# Patient Record
Sex: MALE | Race: WHITE | URBAN - METROPOLITAN AREA
[De-identification: names, ages, dates, MRNs, and addresses within clinical notes are randomized per-mention and may not be internally consistent; named-entity substitution may affect disease eponyms.]

---

## 2023-04-17 ENCOUNTER — WEB ENCOUNTER (OUTPATIENT)
Dept: URBAN - METROPOLITAN AREA CLINIC 19 | Facility: CLINIC | Age: 63
End: 2023-04-17

## 2023-04-17 ENCOUNTER — OFFICE VISIT (OUTPATIENT)
Dept: URBAN - METROPOLITAN AREA CLINIC 19 | Facility: CLINIC | Age: 63
End: 2023-04-17
Payer: COMMERCIAL

## 2023-04-17 ENCOUNTER — TELEPHONE ENCOUNTER (OUTPATIENT)
Dept: URBAN - METROPOLITAN AREA CLINIC 96 | Facility: CLINIC | Age: 63
End: 2023-04-17

## 2023-04-17 VITALS
HEIGHT: 70 IN | WEIGHT: 175 LBS | SYSTOLIC BLOOD PRESSURE: 126 MMHG | DIASTOLIC BLOOD PRESSURE: 80 MMHG | BODY MASS INDEX: 25.05 KG/M2 | TEMPERATURE: 98.1 F

## 2023-04-17 DIAGNOSIS — B19.20 HEPATITIS C VIRUS INFECTION WITHOUT HEPATIC COMA, UNSPECIFIED CHRONICITY: ICD-10-CM

## 2023-04-17 DIAGNOSIS — K76.82 HEPATIC ENCEPHALOPATHY: ICD-10-CM

## 2023-04-17 DIAGNOSIS — R18.8 OTHER ASCITES: ICD-10-CM

## 2023-04-17 DIAGNOSIS — I85.10 SECONDARY ESOPHAGEAL VARICES WITHOUT BLEEDING: ICD-10-CM

## 2023-04-17 DIAGNOSIS — K74.69 OTHER CIRRHOSIS OF LIVER: ICD-10-CM

## 2023-04-17 PROBLEM — 389026000: Status: ACTIVE | Noted: 2023-04-17

## 2023-04-17 PROBLEM — 19943007: Status: ACTIVE | Noted: 2023-04-17

## 2023-04-17 PROBLEM — 14223005: Status: ACTIVE | Noted: 2023-04-17

## 2023-04-17 PROBLEM — 50711007: Status: ACTIVE | Noted: 2023-04-17

## 2023-04-17 PROCEDURE — 99204 OFFICE O/P NEW MOD 45 MIN: CPT | Performed by: INTERNAL MEDICINE

## 2023-04-17 RX ORDER — SPIRONOLACTONE 100 MG/1
1 TABLET TABLET, FILM COATED ORAL ONCE A DAY
Qty: 90 | Refills: 1 | OUTPATIENT
Start: 2023-04-17 | End: 2023-10-14

## 2023-04-17 RX ORDER — FUROSEMIDE 40 MG/1
1 TABLET TABLET ORAL ONCE A DAY
Qty: 90 TABLET | Refills: 1 | OUTPATIENT
Start: 2023-04-17

## 2023-04-17 RX ORDER — NADOLOL 20 MG/1
1 TABLET TABLET ORAL ONCE A DAY
Qty: 90 TABLET | Refills: 1 | OUTPATIENT
Start: 2023-04-17

## 2023-04-17 RX ORDER — RIFAXIMIN 550 MG/1
1 TABLET TABLET ORAL TWICE A DAY
Qty: 180 TABLET | Refills: 1 | OUTPATIENT
Start: 2023-04-17 | End: 2023-10-14

## 2023-04-17 RX ORDER — PANTOPRAZOLE SODIUM 40 MG/1
1 TABLET TABLET, DELAYED RELEASE ORAL ONCE A DAY
Status: ACTIVE | COMMUNITY

## 2023-04-17 RX ORDER — FUROSEMIDE 40 MG/1
1 TABLET TABLET ORAL ONCE A DAY
Status: ACTIVE | COMMUNITY

## 2023-04-17 RX ORDER — SPIRONOLACTONE 50 MG/1
1 TABLET TABLET, FILM COATED ORAL ONCE A DAY
Status: ACTIVE | COMMUNITY

## 2023-04-17 RX ORDER — NADOLOL 20 MG/1
1 TABLETS TABLET ORAL ONCE A DAY
Status: ACTIVE | COMMUNITY

## 2023-04-17 NOTE — HPI-TODAY'S VISIT:
Patient presents to my office as a self-referral since his previous gastroenterologists (Dr. Rolando Rivero, Dr. Elver Villegas) apparently do not take his insurance. I had no records today - will obtain for review. History taken purely from patient and his wife.  The patient was reportedly diagnosed with hepatitis C cirrhosis about 2 years ago. He admitted to prior intravenous drug use, although he also stepped on a hypodermic needle when he was younger. He states that when he was diagnosed, he had his gallbladder removed at [facility] in [place], [place], and he had a lot of issues with fluid (ascites). He was seen by Dr. Rolando Rivero initially in [place], but he eventually saw Dr. Elvre Villegas in [facility] - he has been admitted to [facility] as well for cirrhosis-related issues. His wife states that he was admitted earlier this year (January) to [facility] for confusion. He is currently on diuretics (currently not edematous and with no ascites) and nadolol (for possible varices). He has 2-3 loose bowel movements daily without lactulose, but he does have issues with daytime somnolence/nighttime insomnia and confusion, suggestive of hepatic encephalopathy. He has received his first vaccination for hepatitis A/B. He has not been treated for hepatitis C. He states that he has had recent endoscopy (for variceal screening) and imaging (for HCC screening).

## 2023-04-18 ENCOUNTER — TELEPHONE ENCOUNTER (OUTPATIENT)
Dept: URBAN - METROPOLITAN AREA CLINIC 19 | Facility: CLINIC | Age: 63
End: 2023-04-18

## 2023-04-19 LAB
A/G RATIO: 0.9
AFP, SERUM, TUMOR MARKER: 8.8
ALBUMIN: 3
ALKALINE PHOSPHATASE: 137
ALT (SGPT): 17
AST (SGOT): 44
BILIRUBIN, TOTAL: 3
BUN/CREATININE RATIO: (no result)
BUN: 14
CALCIUM: 8.8
CARBON DIOXIDE, TOTAL: 27
CHLORIDE: 106
CREATININE: 1.28
EGFR: 63
GLOBULIN, TOTAL: 3.2
GLUCOSE: 131
HEMATOCRIT: 28.9
HEMOGLOBIN: 10.3
INR: 1.1
MCH: 34.3
MCHC: 35.6
MCV: 96.3
MPV: 11.4
PLATELET COUNT: (no result)
POTASSIUM: 3.5
PROTEIN, TOTAL: 6.2
PT: 11.7
RDW: 13.4
RED BLOOD CELL COUNT: 3
SODIUM: 139
WHITE BLOOD CELL COUNT: 9

## 2023-05-02 ENCOUNTER — LAB OUTSIDE AN ENCOUNTER (OUTPATIENT)
Dept: URBAN - METROPOLITAN AREA CLINIC 19 | Facility: CLINIC | Age: 63
End: 2023-05-02

## 2023-05-02 ENCOUNTER — TELEPHONE ENCOUNTER (OUTPATIENT)
Dept: URBAN - METROPOLITAN AREA CLINIC 19 | Facility: CLINIC | Age: 63
End: 2023-05-02

## 2023-05-02 PROBLEM — 300331000: Status: ACTIVE | Noted: 2023-05-02

## 2023-05-03 ENCOUNTER — TELEPHONE ENCOUNTER (OUTPATIENT)
Dept: URBAN - METROPOLITAN AREA CLINIC 19 | Facility: CLINIC | Age: 63
End: 2023-05-03

## 2023-05-05 ENCOUNTER — TELEPHONE ENCOUNTER (OUTPATIENT)
Dept: URBAN - METROPOLITAN AREA CLINIC 19 | Facility: CLINIC | Age: 63
End: 2023-05-05

## 2023-05-05 RX ORDER — SPIRONOLACTONE 50 MG/1
1 TABLET TABLET, FILM COATED ORAL ONCE A DAY
Status: ACTIVE | COMMUNITY

## 2023-05-05 RX ORDER — FUROSEMIDE 40 MG/1
1 TABLET TABLET ORAL ONCE A DAY
Status: ACTIVE | COMMUNITY

## 2023-05-05 RX ORDER — PANTOPRAZOLE SODIUM 40 MG/1
1 TABLET TABLET, DELAYED RELEASE ORAL ONCE A DAY
Qty: 90 TABLET | Refills: 1 | OUTPATIENT
Start: 2023-05-05

## 2023-05-05 RX ORDER — SPIRONOLACTONE 100 MG/1
1 TABLET TABLET, FILM COATED ORAL ONCE A DAY
Qty: 90 | Refills: 1 | Status: ACTIVE | COMMUNITY
Start: 2023-04-17 | End: 2023-10-14

## 2023-05-05 RX ORDER — NADOLOL 20 MG/1
1 TABLET TABLET ORAL ONCE A DAY
Qty: 90 TABLET | Refills: 1 | Status: ACTIVE | COMMUNITY
Start: 2023-04-17

## 2023-05-05 RX ORDER — FUROSEMIDE 40 MG/1
1 TABLET TABLET ORAL ONCE A DAY
Qty: 90 TABLET | Refills: 1 | Status: ACTIVE | COMMUNITY
Start: 2023-04-17

## 2023-05-05 RX ORDER — NADOLOL 20 MG/1
1 TABLETS TABLET ORAL ONCE A DAY
Status: ACTIVE | COMMUNITY

## 2023-05-05 RX ORDER — RIFAXIMIN 550 MG/1
1 TABLET TABLET ORAL TWICE A DAY
Qty: 180 TABLET | Refills: 1 | Status: ACTIVE | COMMUNITY
Start: 2023-04-17 | End: 2023-10-14

## 2023-05-05 RX ORDER — PANTOPRAZOLE SODIUM 40 MG/1
1 TABLET TABLET, DELAYED RELEASE ORAL ONCE A DAY
Status: ACTIVE | COMMUNITY

## 2023-05-09 ENCOUNTER — TELEPHONE ENCOUNTER (OUTPATIENT)
Dept: URBAN - METROPOLITAN AREA CLINIC 19 | Facility: CLINIC | Age: 63
End: 2023-05-09

## 2023-05-12 LAB
A/G RATIO: 1
ACTIN (SMOOTH MUSCLE) ANTIBODY (IGG): <20
ALBUMIN: 2.6
ALKALINE PHOSPHATASE: 106
ALPHA-1-ANTITRYPSIN (AAT) PHENOTYPE: (no result)
ALT (SGPT): 13
ANA SCREEN, IFA: NEGATIVE
AST (SGOT): 37
BILIRUBIN, TOTAL: 1.8
BUN/CREATININE RATIO: 15
BUN: 23
CALCIUM: 8.5
CARBON DIOXIDE, TOTAL: 22
CERULOPLASMIN: 26
CHLORIDE: 106
COMMENT: (no result)
COPPER, SERUM: 104
CREATININE: 1.55
EGFR: 50
FERRITIN, SERUM: 31
GLOBULIN, TOTAL: 2.7
GLUCOSE: 145
HCV RNA, QUANTITATIVE REAL TIME PCR: (no result)
HCV RNA, QUANTITATIVE REAL TIME PCR: 6.19
HEMATOCRIT: 23.1
HEMOGLOBIN: 8.1
HEP A AB, IGM: (no result)
HEP B CORE AB, IGM: (no result)
HEPATITIS A AB, TOTAL: REACTIVE
HEPATITIS B SURFACE AB IMMUNITY, QN: <5
HEPATITIS B SURFACE ANTIGEN: (no result)
HEPATITIS C ANTIBODY: REACTIVE
IGG, SUBCLASS 4: 48.6
IMMUNOGLOBULIN G, QN, SERUM: 1358
INDEX: >11
IRON BIND.CAP.(TIBC): 295
IRON SATURATION: 17
IRON: 51
LKM-1 ANTIBODY (IGG): <=20
MCH: 33.5
MCHC: 35.1
MCV: 95.5
MITOCHONDRIAL (M2) ANTIBODY: <=20
MPV: 9.8
PLATELET COUNT: 88
POTASSIUM: 3.9
PROTEIN, TOTAL: 5.3
RDW: 12.7
RED BLOOD CELL COUNT: 2.42
SODIUM: 137
WHITE BLOOD CELL COUNT: 11.3

## 2023-06-08 ENCOUNTER — P2P PATIENT RECORD (OUTPATIENT)
Age: 63
End: 2023-06-08

## 2023-06-19 ENCOUNTER — OFFICE VISIT (OUTPATIENT)
Dept: URBAN - METROPOLITAN AREA CLINIC 19 | Facility: CLINIC | Age: 63
End: 2023-06-19

## 2023-07-05 ENCOUNTER — TELEPHONE ENCOUNTER (OUTPATIENT)
Dept: URBAN - METROPOLITAN AREA CLINIC 19 | Facility: CLINIC | Age: 63
End: 2023-07-05

## 2023-07-07 ENCOUNTER — TELEPHONE ENCOUNTER (OUTPATIENT)
Dept: URBAN - METROPOLITAN AREA CLINIC 19 | Facility: CLINIC | Age: 63
End: 2023-07-07

## 2023-07-25 ENCOUNTER — OFFICE VISIT (OUTPATIENT)
Dept: URBAN - METROPOLITAN AREA CLINIC 19 | Facility: CLINIC | Age: 63
End: 2023-07-25
Payer: COMMERCIAL

## 2023-07-25 VITALS
DIASTOLIC BLOOD PRESSURE: 60 MMHG | TEMPERATURE: 98.7 F | HEART RATE: 68 BPM | BODY MASS INDEX: 27.77 KG/M2 | WEIGHT: 194 LBS | HEIGHT: 70 IN | SYSTOLIC BLOOD PRESSURE: 120 MMHG

## 2023-07-25 DIAGNOSIS — R18.8 OTHER ASCITES: ICD-10-CM

## 2023-07-25 DIAGNOSIS — K76.89 HEPATIC CYST: ICD-10-CM

## 2023-07-25 DIAGNOSIS — I85.10 SECONDARY ESOPHAGEAL VARICES WITHOUT BLEEDING: ICD-10-CM

## 2023-07-25 DIAGNOSIS — K74.69 OTHER CIRRHOSIS OF LIVER: ICD-10-CM

## 2023-07-25 DIAGNOSIS — B19.20 HEPATITIS C VIRUS INFECTION WITHOUT HEPATIC COMA, UNSPECIFIED CHRONICITY: ICD-10-CM

## 2023-07-25 DIAGNOSIS — K76.82 HEPATIC ENCEPHALOPATHY: ICD-10-CM

## 2023-07-25 DIAGNOSIS — K43.9 VENTRAL HERNIA WITHOUT OBSTRUCTION OR GANGRENE: ICD-10-CM

## 2023-07-25 PROBLEM — 414396006: Status: ACTIVE | Noted: 2023-07-25

## 2023-07-25 PROBLEM — 85057007: Status: ACTIVE | Noted: 2023-07-25

## 2023-07-25 PROCEDURE — 99214 OFFICE O/P EST MOD 30 MIN: CPT | Performed by: INTERNAL MEDICINE

## 2023-07-25 RX ORDER — PANTOPRAZOLE SODIUM 40 MG/1
1 TABLET TABLET, DELAYED RELEASE ORAL ONCE A DAY
Qty: 90 TABLET | Refills: 1 | Status: ACTIVE | COMMUNITY
Start: 2023-05-05

## 2023-07-25 RX ORDER — FUROSEMIDE 40 MG/1
1 TABLET TABLET ORAL ONCE A DAY
Status: ACTIVE | COMMUNITY

## 2023-07-25 RX ORDER — PANTOPRAZOLE SODIUM 40 MG/1
1 TABLET TABLET, DELAYED RELEASE ORAL ONCE A DAY
Status: ACTIVE | COMMUNITY

## 2023-07-25 RX ORDER — SPIRONOLACTONE 50 MG/1
1 TABLET TABLET, FILM COATED ORAL ONCE A DAY
Status: ACTIVE | COMMUNITY

## 2023-07-25 RX ORDER — NADOLOL 20 MG/1
1 TABLET TABLET ORAL ONCE A DAY
Qty: 90 TABLET | Refills: 1 | Status: ACTIVE | COMMUNITY
Start: 2023-04-17

## 2023-07-25 RX ORDER — NADOLOL 20 MG/1
1 TABLETS TABLET ORAL ONCE A DAY
Status: ACTIVE | COMMUNITY

## 2023-07-25 RX ORDER — FUROSEMIDE 40 MG/1
1 TABLET TABLET ORAL ONCE A DAY
Qty: 90 TABLET | Refills: 1 | Status: ACTIVE | COMMUNITY
Start: 2023-04-17

## 2023-07-25 RX ORDER — RIFAXIMIN 550 MG/1
1 TABLET TABLET ORAL TWICE A DAY
Qty: 180 TABLET | Refills: 1 | Status: ACTIVE | COMMUNITY
Start: 2023-04-17 | End: 2023-10-14

## 2023-07-25 RX ORDER — SPIRONOLACTONE 100 MG/1
1 TABLET TABLET, FILM COATED ORAL ONCE A DAY
Qty: 90 | Refills: 1 | Status: ACTIVE | COMMUNITY
Start: 2023-04-17 | End: 2023-10-14

## 2023-07-25 NOTE — PHYSICAL EXAM GASTROINTESTINAL
Abdomen , soft, nontender, distended with ventral hernia , no guarding or rigidity , normal bowel sounds , Liver and Spleen , no hepatomegaly present , no hepatosplenomegaly , liver nontender , spleen not palpable Rectal deferred

## 2023-07-25 NOTE — HPI-TODAY'S VISIT:
4/17/23: Patient presents to my office as a self-referral since his previous gastroenterologists (Dr. Rolando Rivero, Dr. Elver Villegas) apparently do not take his insurance. I had no records today - will obtain for review. History taken purely from patient and his wife.  The patient was reportedly diagnosed with hepatitis C cirrhosis about 2 years ago. He admitted to prior intravenous drug use, although he also stepped on a hypodermic needle when he was younger. He states that when he was diagnosed, he had his gallbladder removed at [facility] in [place], [place], and he had a lot of issues with fluid (ascites). He was seen by Dr. Rolando Rivero initially in [place], but he eventually saw Dr. Elver Villegas in [facility] - he has been admitted to [facility] as well for cirrhosis-related issues. His wife states that he was admitted earlier this year (January) to [facility] for confusion. He is currently on diuretics (currently not edematous and with no ascites) and nadolol (for possible varices). He has 2-3 loose bowel movements daily without lactulose, but he does have issues with daytime somnolence/nighttime insomnia and confusion, suggestive of hepatic encephalopathy. He has received his first vaccination for hepatitis A/B. He has not been treated for hepatitis C. He states that he has had recent endoscopy (for variceal screening) and imaging (for HCC screening).  ADDENDUM: I got records from [facility] ([place], [place]) after the patient left my clinic. It showed that he was admitted from 1/5/23 to 1/9/23 - he was seen by Dr. Rolando Rivero at that time. He apparently was on his way from [place] to [place] and had to be picked up by EMS at a motel in [place], [place] and brought to [facility] due to altered mental status (slurred speech, lethargy). He had a battery of imaging studies of his head done to rule out a stroke, it was determined that his altered mental status was due to hepatic encephalopathy - this was treated with lactulose, and his mentation improved. He also had a macrocytic anemia with hematochezia - an EGD done on 1/8/23 showed no signs of portal hypertension but prepyloric ulcers were seen (positive for H. pylori on biopsy). A colonoscopy on 1/9/23 showed no significant pathology, although the endoscopist did mention that there was "friable" mucosa throughout the colon and hemorrhoids. CT scan showed signs of cirrhosis/portal hypertension. There was a ventral hernia that was filled with a small amount of ascites. Cystic lesions were seen in the liver on CT that were verified on MRI. His MELD-Na score was in the low 20s during his admission. He did confirm that he was abusing alcohol at that time. He was discharged on lactulose 30cc po BID, ferrous sulfate 325 mg po QOD, nadolol 20 mg po QD, pantoprazole 40 mg po QD, thiamine 100 mg po daily, furosemide 40 mg po QD, spironolactone 100 mg po daily, and lisinopril 40 mg po QD. It is not clear that he was ever treated for H. pylori.  7/25/23: Patient presents for reevaluation of his cirrhosis. He has not had an alcoholic beverage since January 2023, and he has been compliant with his medications. Minimal edema of the lower extremities and minimal ascites noted. He is wearing an abdominal binder due to the ventral hernia, which is somewhat tender. He has no confusion since starting the Xifaxan - he was able to get financial assistance through [employer]. Also taking lactulose. He has been getting his hepatitis B vaccination (immune to hepatitis A). Labs drawn at his last office visit showed no other cause for his cirrhosis except hepatitis C (viral load [id number] IU/mL). His AFP was elevated at 8.8, but a repeat MRI (done at [facility]) showed that there were no solid masses, only cysts. MELD-Na was calculated at 15. He has never been treated for hepatitis C - it is unclear when he may have contracted the virus.  We had a long discussion about his need to maintain abstinence and be compliant with low-sodium diet, vaccinations, and medications. He is still having some discomfort related to the hernia - I agreed to refer him to [facility] to discuss possible liver transplant and/or hernia surgery. He is high-risk for surgery due to his cirrhosis; patient would benefit from having his ventral hernia repair done at a transplant center by a general surgeon or hepatobiliary surgeon. I will also leave treatment of hepatitis C up to the transplant center since this may impact his liver transplant status.

## 2023-07-26 ENCOUNTER — TELEPHONE ENCOUNTER (OUTPATIENT)
Dept: URBAN - METROPOLITAN AREA CLINIC 19 | Facility: CLINIC | Age: 63
End: 2023-07-26

## 2023-08-10 ENCOUNTER — LAB OUTSIDE AN ENCOUNTER (OUTPATIENT)
Dept: URBAN - METROPOLITAN AREA CLINIC 19 | Facility: CLINIC | Age: 63
End: 2023-08-10

## 2023-08-10 PROBLEM — 6185008: Status: ACTIVE | Noted: 2023-08-10

## 2023-09-22 ENCOUNTER — TELEPHONE ENCOUNTER (OUTPATIENT)
Dept: URBAN - METROPOLITAN AREA CLINIC 19 | Facility: CLINIC | Age: 63
End: 2023-09-22

## 2023-09-27 ENCOUNTER — OFFICE VISIT (OUTPATIENT)
Dept: URBAN - METROPOLITAN AREA LAB 2 | Facility: LAB | Age: 63
End: 2023-09-27

## 2023-10-30 ENCOUNTER — OFFICE VISIT (OUTPATIENT)
Dept: URBAN - METROPOLITAN AREA MEDICAL CENTER 33 | Facility: MEDICAL CENTER | Age: 63
End: 2023-10-30
Payer: COMMERCIAL

## 2023-10-30 DIAGNOSIS — K76.6 CLINICALLY SIGNIFICANT PORTAL HYPERTENSION: ICD-10-CM

## 2023-10-30 DIAGNOSIS — K74.69 CIRRHOSIS, CRYPTOGENIC: ICD-10-CM

## 2023-10-30 DIAGNOSIS — K29.60 ADENOPAPILLOMATOSIS GASTRICA: ICD-10-CM

## 2023-10-30 DIAGNOSIS — I85.10 ESOPH VARICE OTHER DIS: ICD-10-CM

## 2023-10-30 PROCEDURE — 43239 EGD BIOPSY SINGLE/MULTIPLE: CPT | Performed by: INTERNAL MEDICINE

## 2023-10-30 RX ORDER — PANTOPRAZOLE SODIUM 40 MG/1
1 TABLET TABLET, DELAYED RELEASE ORAL ONCE A DAY
Status: ACTIVE | COMMUNITY

## 2023-10-30 RX ORDER — SPIRONOLACTONE 50 MG/1
1 TABLET TABLET, FILM COATED ORAL ONCE A DAY
Status: ACTIVE | COMMUNITY

## 2023-10-30 RX ORDER — PANTOPRAZOLE SODIUM 40 MG/1
1 TABLET TABLET, DELAYED RELEASE ORAL ONCE A DAY
Qty: 90 TABLET | Refills: 1 | Status: ACTIVE | COMMUNITY
Start: 2023-05-05

## 2023-10-30 RX ORDER — FUROSEMIDE 40 MG/1
1 TABLET TABLET ORAL ONCE A DAY
Status: ACTIVE | COMMUNITY

## 2023-10-30 RX ORDER — FUROSEMIDE 40 MG/1
1 TABLET TABLET ORAL ONCE A DAY
Qty: 90 TABLET | Refills: 1 | Status: ACTIVE | COMMUNITY
Start: 2023-04-17

## 2023-10-30 RX ORDER — NADOLOL 20 MG/1
1 TABLET TABLET ORAL ONCE A DAY
Qty: 90 TABLET | Refills: 1 | Status: ACTIVE | COMMUNITY
Start: 2023-04-17

## 2023-10-30 RX ORDER — NADOLOL 20 MG/1
1 TABLETS TABLET ORAL ONCE A DAY
Status: ACTIVE | COMMUNITY

## 2023-11-06 ENCOUNTER — ERX REFILL RESPONSE (OUTPATIENT)
Dept: URBAN - METROPOLITAN AREA CLINIC 19 | Facility: CLINIC | Age: 63
End: 2023-11-06

## 2023-11-06 RX ORDER — NADOLOL 20 MG/1
1 TABLET TABLET ORAL ONCE A DAY
Qty: 90 TABLET | Refills: 3 | OUTPATIENT

## 2023-11-06 RX ORDER — PANTOPRAZOLE SODIUM 40 MG/1
1 TABLET TABLET, DELAYED RELEASE ORAL ONCE A DAY
Qty: 90 TABLET | Refills: 3 | OUTPATIENT

## 2023-11-06 RX ORDER — PANTOPRAZOLE SODIUM 40 MG/1
1 TABLET TABLET, DELAYED RELEASE ORAL ONCE A DAY
OUTPATIENT

## 2023-11-06 RX ORDER — NADOLOL 20 MG/1
1 TABLET TABLET ORAL ONCE A DAY
Qty: 90 TABLET | Refills: 1 | OUTPATIENT

## 2024-01-22 ENCOUNTER — DASHBOARD ENCOUNTERS (OUTPATIENT)
Age: 64
End: 2024-01-22

## 2024-01-23 ENCOUNTER — OFFICE VISIT (OUTPATIENT)
Dept: URBAN - METROPOLITAN AREA CLINIC 19 | Facility: CLINIC | Age: 64
End: 2024-01-23

## 2024-01-23 RX ORDER — PANTOPRAZOLE SODIUM 40 MG/1
1 TABLET TABLET, DELAYED RELEASE ORAL ONCE A DAY
Qty: 90 TABLET | Refills: 3 | Status: ACTIVE | COMMUNITY

## 2024-01-23 RX ORDER — NADOLOL 20 MG/1
1 TABLET TABLET ORAL ONCE A DAY
Qty: 90 TABLET | Refills: 3 | Status: ACTIVE | COMMUNITY

## 2024-01-23 RX ORDER — SPIRONOLACTONE 50 MG/1
1 TABLET TABLET, FILM COATED ORAL ONCE A DAY
Status: ACTIVE | COMMUNITY

## 2024-01-23 RX ORDER — FUROSEMIDE 40 MG/1
1 TABLET TABLET ORAL ONCE A DAY
Status: ACTIVE | COMMUNITY

## 2024-01-23 RX ORDER — FUROSEMIDE 40 MG/1
1 TABLET TABLET ORAL ONCE A DAY
Qty: 90 TABLET | Refills: 1 | Status: ACTIVE | COMMUNITY
Start: 2023-04-17

## 2024-10-04 ENCOUNTER — APPOINTMENT (RX ONLY)
Dept: URBAN - METROPOLITAN AREA CLINIC 161 | Facility: CLINIC | Age: 64
Setting detail: DERMATOLOGY
End: 2024-10-04

## 2024-10-08 ENCOUNTER — APPOINTMENT (RX ONLY)
Dept: URBAN - METROPOLITAN AREA CLINIC 161 | Facility: CLINIC | Age: 64
Setting detail: DERMATOLOGY
End: 2024-10-08

## 2024-10-08 PROBLEM — D23.39 OTHER BENIGN NEOPLASM OF SKIN OF OTHER PARTS OF FACE: Status: ACTIVE | Noted: 2024-10-08

## 2024-10-08 PROCEDURE — 11442 EXC FACE-MM B9+MARG 1.1-2 CM: CPT

## 2024-10-08 PROCEDURE — 12052 INTMD RPR FACE/MM 2.6-5.0 CM: CPT

## 2024-10-08 PROCEDURE — ? EXCISION

## 2024-10-08 NOTE — PROCEDURE: EXCISION

## 2024-10-15 ENCOUNTER — APPOINTMENT (RX ONLY)
Dept: URBAN - METROPOLITAN AREA CLINIC 161 | Facility: CLINIC | Age: 64
Setting detail: DERMATOLOGY
End: 2024-10-15

## 2024-10-15 DIAGNOSIS — Z48.817 ENCOUNTER FOR SURGICAL AFTERCARE FOLLOWING SURGERY ON THE SKIN AND SUBCUTANEOUS TISSUE: ICD-10-CM

## 2024-10-15 PROCEDURE — ? POST-OP WOUND EVALUATION

## 2024-10-15 ASSESSMENT — LOCATION SIMPLE DESCRIPTION DERM: LOCATION SIMPLE: LEFT TEMPLE

## 2024-10-15 ASSESSMENT — LOCATION ZONE DERM: LOCATION ZONE: FACE

## 2024-10-15 ASSESSMENT — LOCATION DETAILED DESCRIPTION DERM: LOCATION DETAILED: LEFT CENTRAL TEMPLE

## 2024-10-15 NOTE — PROCEDURE: POST-OP WOUND EVALUATION
Body Location Override (Optional): left temple
Detail Level: Simple
Add 66582 Cpt? (Important Note: In 2017 The Use Of 04918 Is Being Tracked By Cms To Determine Future Global Period Reimbursement For Global Periods): no
Quality 357: Surgical Site Infection (Ssi): No surgical site infection
Wound Evaluated By (Optional): milan
Wound Diameter In Cm(Optional): 0
Wound Crusting?: clean
Sutures?: intact
Wound Color?: pink
Wound Granulation?: early

## 2025-04-15 ENCOUNTER — ERX REFILL RESPONSE (OUTPATIENT)
Dept: URBAN - METROPOLITAN AREA CLINIC 19 | Facility: CLINIC | Age: 65
End: 2025-04-15

## 2025-04-15 RX ORDER — RIFAXIMIN 550 MG/1
TAKE 1 TABLET BY MOUTH TWICE A DAY TABLET ORAL TWICE A DAY
Qty: 180 | Refills: 3

## 2025-06-12 ENCOUNTER — LAB OUTSIDE AN ENCOUNTER (OUTPATIENT)
Dept: URBAN - METROPOLITAN AREA CLINIC 19 | Facility: CLINIC | Age: 65
End: 2025-06-12

## 2025-06-12 ENCOUNTER — OFFICE VISIT (OUTPATIENT)
Dept: URBAN - METROPOLITAN AREA CLINIC 19 | Facility: CLINIC | Age: 65
End: 2025-06-12
Payer: COMMERCIAL

## 2025-06-12 DIAGNOSIS — K56.609 SMALL BOWEL OBSTRUCTION: ICD-10-CM

## 2025-06-12 DIAGNOSIS — I85.00 ESOPHAGEAL VARICES: ICD-10-CM

## 2025-06-12 DIAGNOSIS — K70.30 ALCOHOLIC CIRRHOSIS: ICD-10-CM

## 2025-06-12 DIAGNOSIS — K42.9 UMBILICAL HERNIA: ICD-10-CM

## 2025-06-12 DIAGNOSIS — K76.82 HEPATIC ENCEPHALOPATHY: ICD-10-CM

## 2025-06-12 DIAGNOSIS — R18.8 ASCITES: ICD-10-CM

## 2025-06-12 PROCEDURE — 99214 OFFICE O/P EST MOD 30 MIN: CPT | Performed by: INTERNAL MEDICINE

## 2025-06-12 RX ORDER — IPRATROPIUM BROMIDE AND ALBUTEROL SULFATE .5; 2.5 MG/3ML; MG/3ML
3 ML AS NEEDED SOLUTION RESPIRATORY (INHALATION)
Status: ACTIVE | COMMUNITY

## 2025-06-12 RX ORDER — RIFAXIMIN 550 MG/1
TAKE 1 TABLET BY MOUTH TWICE A DAY TABLET ORAL TWICE A DAY
Qty: 180 | Refills: 3 | Status: DISCONTINUED | COMMUNITY

## 2025-06-12 RX ORDER — PANTOPRAZOLE SODIUM 40 MG/1
1 TABLET TABLET, DELAYED RELEASE ORAL ONCE A DAY
Qty: 90 TABLET | Refills: 3 | Status: DISCONTINUED | COMMUNITY

## 2025-06-12 RX ORDER — SPIRONOLACTONE 100 MG/1
1 TABLET TABLET, FILM COATED ORAL ONCE A DAY
Status: ACTIVE | COMMUNITY

## 2025-06-12 RX ORDER — NADOLOL 20 MG/1
1 TABLET TABLET ORAL ONCE A DAY
Qty: 90 TABLET | Refills: 3 | Status: ACTIVE | COMMUNITY

## 2025-06-12 RX ORDER — FUROSEMIDE 40 MG/1
1 TABLET TABLET ORAL ONCE A DAY
Status: DISCONTINUED | COMMUNITY

## 2025-06-12 RX ORDER — FUROSEMIDE 40 MG/1
1 TABLET TABLET ORAL ONCE A DAY
Qty: 90 TABLET | Refills: 1 | Status: DISCONTINUED | COMMUNITY
Start: 2023-04-17

## 2025-06-12 RX ORDER — OXYCODONE HYDROCHLORIDE AND ACETAMINOPHEN 10; 325 MG/1; MG/1
1 TABLET AS NEEDED TABLET ORAL
Refills: 0 | Status: ACTIVE | COMMUNITY

## 2025-06-12 RX ORDER — ATORVASTATIN CALCIUM 40 MG/1
1 TABLET TABLET, FILM COATED ORAL ONCE A DAY
Status: ACTIVE | COMMUNITY

## 2025-06-12 RX ORDER — BUMETANIDE 1 MG/1
1 TABLET TABLET ORAL ONCE A DAY
Status: ACTIVE | COMMUNITY

## 2025-06-12 NOTE — HPI-TODAY'S VISIT:
4/17/23 (Dr. Guille Hernández): Patient presents to my office as a self-referral since his previous gastroenterologists (Dr. Rolando Rivero, Dr. Elver Villegas) apparently do not take his insurance. I had no records today - will obtain for review. History taken purely from patient and his wife.  The patient was reportedly diagnosed with hepatitis C cirrhosis about 2 years ago. He admitted to prior intravenous drug use, although he also stepped on a hypodermic needle when he was younger. He states that when he was diagnosed, he had his gallbladder removed at [facility] in [place], [place], and he had a lot of issues with fluid (ascites). He was seen by Dr. Rolando Rivero initially in [place], but he eventually saw Dr. Elver Villegas in [facility] - he has been admitted to [facility] as well for cirrhosis-related issues. His wife states that he was admitted earlier this year (January) to [facility] for confusion. He is currently on diuretics (currently not edematous and with no ascites) and nadolol (for possible varices). He has 2-3 loose bowel movements daily without lactulose, but he does have issues with daytime somnolence/nighttime insomnia and confusion, suggestive of hepatic encephalopathy. He has received his first vaccination for hepatitis A/B. He has not been treated for hepatitis C. He states that he has had recent endoscopy (for variceal screening) and imaging (for HCC screening).  ADDENDUM: I got records from [facility] [place] [facility] ([place], [place]) after the patient left my clinic. It showed that he was admitted from 1/5/23 to 1/9/23 - he was seen by Dr. Rolando Rivero at that time. He apparently was on his way from [facility] and had to be picked up by EMS at a motel in [place], [place] and brought to [facility] due to altered mental status (slurred speech, lethargy). He had a battery of imaging studies of his head done to rule out a stroke, it was determined that his altered mental status was due to hepatic encephalopathy - this was treated with lactulose, and his mentation improved. He also had a macrocytic anemia with hematochezia - an EGD done on 1/8/23 showed no signs of portal hypertension but prepyloric ulcers were seen (positive for H. pylori on biopsy). A colonoscopy on 1/9/23 showed no significant pathology, although the endoscopist did mention that there was "friable" mucosa throughout the colon and hemorrhoids. CT scan showed signs of cirrhosis/portal hypertension. There was a ventral hernia that was filled with a small amount of ascites. Cystic lesions were seen in the liver on CT that were verified on MRI. His MELD-Na score was in the low 20s during his admission. He did confirm that he was abusing alcohol at that time. He was discharged on lactulose 30cc po BID, ferrous sulfate 325 mg po QOD, nadolol 20 mg po QD, pantoprazole 40 mg po QD, thiamine 100 mg po daily, furosemide 40 mg po QD, spironolactone 100 mg po daily, and lisinopril 40 mg po QD. It is not clear that he was ever treated for H. pylori.  7/25/23 (Dr. Guille Hernández): Patient presents for reevaluation of his cirrhosis. He has not had an alcoholic beverage since January 2023, and he has been compliant with his medications. Minimal edema of the lower extremities and minimal ascites noted. He is wearing an abdominal binder due to the ventral hernia, which is somewhat tender. He has no confusion since starting the Xifaxan - he was able to get financial assistance through [employer]. Also taking lactulose. He has been getting his hepatitis B vaccination (immune to hepatitis A). Labs drawn at his last office visit showed no other cause for his cirrhosis except hepatitis C (viral load [id number] IU/mL). His AFP was elevated at 8.8, but a repeat MRI (done at [facility]) showed that there were no solid masses, only cysts. MELD-Na was calculated at 15. He has never been treated for hepatitis C - it is unclear when he may have contracted the virus.  We had a long discussion about his need to maintain abstinence and be compliant with low-sodium diet, vaccinations, and medications. He is still having some discomfort related to the hernia - I agreed to refer him to [facility] to discuss possible liver transplant and/or hernia surgery. He is high-risk for surgery due to his cirrhosis; patient would benefit from having his ventral hernia repair done at a transplant center by a general surgeon or hepatobiliary surgeon. I will also leave treatment of hepatitis C up to the transplant center since this may impact his liver transplant status.  6/12/25 (Dr. Guille Hernández):  Patient returns after almost 2 years for reevaluation of his hepatitis C cirrhosis. While this is what I saw him for in the past, he states that he was referred to me today specifically by [facility] for endoscopic evaluation after a small bowel obstruction back in May 2025. Records to be obtained for review. Apparently, he did see Dr. Ankur Steven at [facility] after seeing me, and he was deemed not to be a transplant candidate due to his neurologic issues. 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 They also did not repair his umbilical hernia, although it is not bothering him at the current time. I will obtain records from [facility] to see what kind of evaluation has been done over the past 2 years.  Since I have no records, I had to rely on the patient's recounting of events. He states that he was diagnosed with a small bowel obstruction that resolved without surgery a couple of months ago. While adhesions/internal hernias are the most common causes of small bowel obstructions, someone told him that Crohn's disease needed to be "ruled out". He has COPD - medications listed below. He does see a cardiologist (Dr. Marta Gill), although it is not clear what kind of cardiac disease he has.

## 2025-06-12 NOTE — PHYSICAL EXAM GASTROINTESTINAL
Abdomen , soft, nontender, nondistended , + umbilical hernia (reducible), no guarding or rigidity , no masses palpable , normal bowel sounds , Liver and Spleen , no hepatomegaly present , no hepatosplenomegaly , liver nontender , spleen not palpable Rectal deferred

## 2025-06-13 ENCOUNTER — TELEPHONE ENCOUNTER (OUTPATIENT)
Dept: URBAN - METROPOLITAN AREA CLINIC 19 | Facility: CLINIC | Age: 65
End: 2025-06-13

## 2025-06-18 LAB
A/G RATIO: 1
AFP-L3: 7.5
AFP: 5.4
ALBUMIN: 2.7
ALKALINE PHOSPHATASE: 110
ALT (SGPT): 24
AST (SGOT): 57
BILIRUBIN, TOTAL: 3.2
BUN/CREATININE RATIO: 10
BUN: 16
C-REACTIVE PROTEIN, QUANT: <3
CALCIUM: 8.7
CARBON DIOXIDE, TOTAL: 27
CHLORIDE: 105
CREATININE: 1.58
DCP (DES GAMMA CARBOXY PROTHROMBIN): 2.2
EGFR: 49
GLOBULIN, TOTAL: 2.8
GLUCOSE: 144
HEMATOCRIT: 26.2
HEMOGLOBIN: 8.9
INR: 1.3
MCH: 34.8
MCHC: 34
MCV: 102.3
MPV: 10.1
PLATELET COUNT: 92
POTASSIUM: 3.6
PROTEIN, TOTAL: 5.5
PT: 13.8
RDW: 14.9
RED BLOOD CELL COUNT: 2.56
SED RATE BY MODIFIED: 33
SODIUM: 139
WHITE BLOOD CELL COUNT: 6.7

## 2025-07-16 ENCOUNTER — OFFICE VISIT (OUTPATIENT)
Dept: URBAN - METROPOLITAN AREA MEDICAL CENTER 25 | Facility: MEDICAL CENTER | Age: 65
End: 2025-07-16
Payer: COMMERCIAL

## 2025-07-16 ENCOUNTER — TELEPHONE ENCOUNTER (OUTPATIENT)
Dept: URBAN - METROPOLITAN AREA CLINIC 19 | Facility: CLINIC | Age: 65
End: 2025-07-16

## 2025-07-16 DIAGNOSIS — K74.69 CIRRHOSIS, CRYPTOGENIC: ICD-10-CM

## 2025-07-16 PROCEDURE — 43239 EGD BIOPSY SINGLE/MULTIPLE: CPT | Performed by: INTERNAL MEDICINE

## 2025-07-16 RX ORDER — BUMETANIDE 1 MG/1
1 TABLET TABLET ORAL ONCE A DAY
Status: ACTIVE | COMMUNITY

## 2025-07-16 RX ORDER — ATORVASTATIN CALCIUM 40 MG/1
1 TABLET TABLET, FILM COATED ORAL ONCE A DAY
Status: ACTIVE | COMMUNITY

## 2025-07-16 RX ORDER — IPRATROPIUM BROMIDE AND ALBUTEROL SULFATE .5; 2.5 MG/3ML; MG/3ML
3 ML AS NEEDED SOLUTION RESPIRATORY (INHALATION)
Status: ACTIVE | COMMUNITY

## 2025-07-16 RX ORDER — NADOLOL 20 MG/1
1 TABLET TABLET ORAL ONCE A DAY
Qty: 90 TABLET | Refills: 3 | Status: ACTIVE | COMMUNITY

## 2025-07-16 RX ORDER — OXYCODONE HYDROCHLORIDE AND ACETAMINOPHEN 10; 325 MG/1; MG/1
1 TABLET AS NEEDED TABLET ORAL
Refills: 0 | Status: ACTIVE | COMMUNITY

## 2025-07-16 RX ORDER — SPIRONOLACTONE 100 MG/1
1 TABLET TABLET, FILM COATED ORAL ONCE A DAY
Status: ACTIVE | COMMUNITY

## 2025-07-17 ENCOUNTER — TELEPHONE ENCOUNTER (OUTPATIENT)
Dept: URBAN - METROPOLITAN AREA CLINIC 19 | Facility: CLINIC | Age: 65
End: 2025-07-17

## 2025-08-11 ENCOUNTER — TELEPHONE ENCOUNTER (OUTPATIENT)
Dept: URBAN - METROPOLITAN AREA CLINIC 19 | Facility: CLINIC | Age: 65
End: 2025-08-11

## 2025-08-22 ENCOUNTER — OFFICE VISIT (OUTPATIENT)
Dept: URBAN - METROPOLITAN AREA MEDICAL CENTER 25 | Facility: MEDICAL CENTER | Age: 65
End: 2025-08-22

## 2025-08-22 RX ORDER — OXYCODONE HYDROCHLORIDE AND ACETAMINOPHEN 10; 325 MG/1; MG/1
1 TABLET AS NEEDED TABLET ORAL
Refills: 0 | Status: ACTIVE | COMMUNITY

## 2025-08-22 RX ORDER — BUMETANIDE 1 MG/1
1 TABLET TABLET ORAL ONCE A DAY
Status: ACTIVE | COMMUNITY

## 2025-08-22 RX ORDER — NADOLOL 20 MG/1
1 TABLET TABLET ORAL ONCE A DAY
Qty: 90 TABLET | Refills: 3 | Status: ACTIVE | COMMUNITY

## 2025-08-22 RX ORDER — ATORVASTATIN CALCIUM 40 MG/1
1 TABLET TABLET, FILM COATED ORAL ONCE A DAY
Status: ACTIVE | COMMUNITY

## 2025-08-22 RX ORDER — SPIRONOLACTONE 100 MG/1
1 TABLET TABLET, FILM COATED ORAL ONCE A DAY
Status: ACTIVE | COMMUNITY

## 2025-08-22 RX ORDER — IPRATROPIUM BROMIDE AND ALBUTEROL SULFATE .5; 2.5 MG/3ML; MG/3ML
3 ML AS NEEDED SOLUTION RESPIRATORY (INHALATION)
Status: ACTIVE | COMMUNITY